# Patient Record
Sex: FEMALE | Race: WHITE | NOT HISPANIC OR LATINO
[De-identification: names, ages, dates, MRNs, and addresses within clinical notes are randomized per-mention and may not be internally consistent; named-entity substitution may affect disease eponyms.]

---

## 2018-06-18 ENCOUNTER — APPOINTMENT (OUTPATIENT)
Dept: OBGYN | Facility: CLINIC | Age: 73
End: 2018-06-18
Payer: MEDICARE

## 2018-06-18 VITALS
WEIGHT: 121 LBS | HEIGHT: 62 IN | HEART RATE: 64 BPM | DIASTOLIC BLOOD PRESSURE: 70 MMHG | BODY MASS INDEX: 22.26 KG/M2 | SYSTOLIC BLOOD PRESSURE: 106 MMHG

## 2018-06-18 DIAGNOSIS — N95.2 POSTMENOPAUSAL ATROPHIC VAGINITIS: ICD-10-CM

## 2018-06-18 DIAGNOSIS — R92.2 INCONCLUSIVE MAMMOGRAM: ICD-10-CM

## 2018-06-18 DIAGNOSIS — Z12.4 ENCOUNTER FOR SCREENING FOR MALIGNANT NEOPLASM OF CERVIX: ICD-10-CM

## 2018-06-18 PROCEDURE — G0101: CPT

## 2018-07-30 ENCOUNTER — APPOINTMENT (OUTPATIENT)
Dept: SURGICAL ONCOLOGY | Facility: CLINIC | Age: 73
End: 2018-07-30
Payer: MEDICARE

## 2018-07-30 VITALS
WEIGHT: 120 LBS | HEART RATE: 59 BPM | RESPIRATION RATE: 16 BRPM | HEIGHT: 62 IN | SYSTOLIC BLOOD PRESSURE: 135 MMHG | DIASTOLIC BLOOD PRESSURE: 80 MMHG | BODY MASS INDEX: 22.08 KG/M2

## 2018-07-30 PROCEDURE — 99203 OFFICE O/P NEW LOW 30 MIN: CPT

## 2018-08-08 ENCOUNTER — RESULT REVIEW (OUTPATIENT)
Age: 73
End: 2018-08-08

## 2018-08-08 ENCOUNTER — OUTPATIENT (OUTPATIENT)
Dept: OUTPATIENT SERVICES | Facility: HOSPITAL | Age: 73
LOS: 1 days | End: 2018-08-08
Payer: MEDICARE

## 2018-08-08 DIAGNOSIS — C43.9 MALIGNANT MELANOMA OF SKIN, UNSPECIFIED: ICD-10-CM

## 2018-08-08 PROCEDURE — 88321 CONSLTJ&REPRT SLD PREP ELSWR: CPT

## 2018-09-10 ENCOUNTER — FORM ENCOUNTER (OUTPATIENT)
Age: 73
End: 2018-09-10

## 2018-09-11 ENCOUNTER — OUTPATIENT (OUTPATIENT)
Dept: OUTPATIENT SERVICES | Facility: HOSPITAL | Age: 73
LOS: 1 days | End: 2018-09-11
Payer: MEDICARE

## 2018-09-11 VITALS
HEART RATE: 64 BPM | DIASTOLIC BLOOD PRESSURE: 83 MMHG | WEIGHT: 119.93 LBS | RESPIRATION RATE: 18 BRPM | HEIGHT: 62.5 IN | SYSTOLIC BLOOD PRESSURE: 117 MMHG | TEMPERATURE: 98 F | OXYGEN SATURATION: 100 %

## 2018-09-11 DIAGNOSIS — C43.62 MALIGNANT MELANOMA OF LEFT UPPER LIMB, INCLUDING SHOULDER: ICD-10-CM

## 2018-09-11 DIAGNOSIS — Z01.818 ENCOUNTER FOR OTHER PREPROCEDURAL EXAMINATION: ICD-10-CM

## 2018-09-11 PROCEDURE — 83615 LACTATE (LD) (LDH) ENZYME: CPT

## 2018-09-11 PROCEDURE — G0463: CPT

## 2018-09-11 PROCEDURE — 71046 X-RAY EXAM CHEST 2 VIEWS: CPT | Mod: 26

## 2018-09-11 PROCEDURE — 85027 COMPLETE CBC AUTOMATED: CPT

## 2018-09-11 PROCEDURE — 71046 X-RAY EXAM CHEST 2 VIEWS: CPT

## 2018-09-11 RX ORDER — LIDOCAINE HCL 20 MG/ML
0.2 VIAL (ML) INJECTION ONCE
Qty: 0 | Refills: 0 | Status: DISCONTINUED | OUTPATIENT
Start: 2018-09-20 | End: 2018-10-05

## 2018-09-11 RX ORDER — SODIUM CHLORIDE 9 MG/ML
3 INJECTION INTRAMUSCULAR; INTRAVENOUS; SUBCUTANEOUS EVERY 8 HOURS
Qty: 0 | Refills: 0 | Status: DISCONTINUED | OUTPATIENT
Start: 2018-09-20 | End: 2018-10-05

## 2018-09-11 NOTE — H&P PST ADULT - ATTENDING COMMENTS
A 73-year-old lady with a 0.5 mm melanoma of her left shoulder schedule for appropriate excision, and primary closure.    Diagnosis and management were reviewed with her in my office and again on morning of procedure.    All questions answered, consent on chart

## 2018-09-11 NOTE — H&P PST ADULT - HISTORY OF PRESENT ILLNESS
73yr old female with malignant melanoma of shoulder  coming in for wide excision left shoulder melanoma

## 2018-09-19 ENCOUNTER — TRANSCRIPTION ENCOUNTER (OUTPATIENT)
Age: 73
End: 2018-09-19

## 2018-09-19 RX ORDER — ONDANSETRON 8 MG/1
4 TABLET, FILM COATED ORAL ONCE
Qty: 0 | Refills: 0 | Status: DISCONTINUED | OUTPATIENT
Start: 2018-09-20 | End: 2018-10-05

## 2018-09-19 RX ORDER — SODIUM CHLORIDE 9 MG/ML
1000 INJECTION, SOLUTION INTRAVENOUS
Qty: 0 | Refills: 0 | Status: DISCONTINUED | OUTPATIENT
Start: 2018-09-20 | End: 2018-10-05

## 2018-09-19 RX ORDER — CELECOXIB 200 MG/1
200 CAPSULE ORAL ONCE
Qty: 0 | Refills: 0 | Status: DISCONTINUED | OUTPATIENT
Start: 2018-09-20 | End: 2018-10-05

## 2018-09-20 ENCOUNTER — RESULT REVIEW (OUTPATIENT)
Age: 73
End: 2018-09-20

## 2018-09-20 ENCOUNTER — OUTPATIENT (OUTPATIENT)
Dept: OUTPATIENT SERVICES | Facility: HOSPITAL | Age: 73
LOS: 1 days | End: 2018-09-20
Payer: MEDICARE

## 2018-09-20 ENCOUNTER — APPOINTMENT (OUTPATIENT)
Dept: SURGICAL ONCOLOGY | Facility: HOSPITAL | Age: 73
End: 2018-09-20

## 2018-09-20 VITALS
RESPIRATION RATE: 12 BRPM | SYSTOLIC BLOOD PRESSURE: 120 MMHG | OXYGEN SATURATION: 100 % | HEART RATE: 83 BPM | DIASTOLIC BLOOD PRESSURE: 63 MMHG

## 2018-09-20 VITALS
WEIGHT: 119.93 LBS | SYSTOLIC BLOOD PRESSURE: 111 MMHG | HEIGHT: 62.5 IN | OXYGEN SATURATION: 98 % | TEMPERATURE: 97 F | RESPIRATION RATE: 18 BRPM | HEART RATE: 55 BPM | DIASTOLIC BLOOD PRESSURE: 72 MMHG

## 2018-09-20 DIAGNOSIS — C43.62 MALIGNANT MELANOMA OF LEFT UPPER LIMB, INCLUDING SHOULDER: ICD-10-CM

## 2018-09-20 DIAGNOSIS — Z01.818 ENCOUNTER FOR OTHER PREPROCEDURAL EXAMINATION: ICD-10-CM

## 2018-09-20 PROCEDURE — 88305 TISSUE EXAM BY PATHOLOGIST: CPT

## 2018-09-20 PROCEDURE — 14001 TIS TRNFR TRUNK 10.1-30SQCM: CPT

## 2018-09-20 PROCEDURE — 14021 TIS TRNFR S/A/L 10.1-30 SQCM: CPT

## 2018-09-20 PROCEDURE — 88305 TISSUE EXAM BY PATHOLOGIST: CPT | Mod: 26

## 2018-09-20 PROCEDURE — 11606 EXC TR-EXT MAL+MARG >4 CM: CPT | Mod: 59

## 2018-09-20 RX ORDER — ACETAMINOPHEN 500 MG
1000 TABLET ORAL ONCE
Qty: 0 | Refills: 0 | Status: COMPLETED | OUTPATIENT
Start: 2018-09-20 | End: 2018-09-20

## 2018-09-20 RX ORDER — LEVOTHYROXINE SODIUM 125 MCG
1 TABLET ORAL
Qty: 0 | Refills: 0 | COMMUNITY

## 2018-09-20 RX ORDER — CELECOXIB 200 MG/1
200 CAPSULE ORAL ONCE
Qty: 0 | Refills: 0 | Status: COMPLETED | OUTPATIENT
Start: 2018-09-20 | End: 2018-09-20

## 2018-09-20 RX ADMIN — Medication 1000 MILLIGRAM(S): at 09:09

## 2018-09-20 RX ADMIN — CELECOXIB 200 MILLIGRAM(S): 200 CAPSULE ORAL at 09:09

## 2018-09-20 NOTE — ASU DISCHARGE PLAN (ADULT/PEDIATRIC). - ITEMS TO FOLLOWUP WITH YOUR PHYSICIAN'S
Dr. Cobian should call with pathology report before Labor Day, that conversation will determine subsequent follow-up Dr. Cobian should call with pathology report by October 01, that conversation will determine subsequent follow-up

## 2018-09-20 NOTE — ASU PATIENT PROFILE, ADULT - PMH
Fall (on) (from) other stairs and steps, sequela  fracture rt. wrist subdural hematoma 8/2017  no tx  Hypothyroidism

## 2018-09-25 LAB — SURGICAL PATHOLOGY STUDY: SIGNIFICANT CHANGE UP
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
## 2018-10-26 PROBLEM — E03.9 HYPOTHYROIDISM, UNSPECIFIED: Chronic | Status: ACTIVE | Noted: 2018-09-11

## 2018-12-02 PROBLEM — Z86.010 HISTORY OF COLONIC POLYPS: Status: RESOLVED | Noted: 2018-07-30 | Resolved: 2018-12-02

## 2018-12-02 PROBLEM — C44.90 SKIN CANCER: Status: ACTIVE | Noted: 2018-07-30

## 2018-12-03 ENCOUNTER — APPOINTMENT (OUTPATIENT)
Dept: SURGICAL ONCOLOGY | Facility: CLINIC | Age: 73
End: 2018-12-03
Payer: MEDICARE

## 2018-12-03 VITALS
BODY MASS INDEX: 21.71 KG/M2 | SYSTOLIC BLOOD PRESSURE: 117 MMHG | DIASTOLIC BLOOD PRESSURE: 69 MMHG | HEART RATE: 57 BPM | HEIGHT: 62 IN | RESPIRATION RATE: 15 BRPM | WEIGHT: 118 LBS

## 2018-12-03 DIAGNOSIS — Z86.010 PERSONAL HISTORY OF COLONIC POLYPS: ICD-10-CM

## 2018-12-03 DIAGNOSIS — C44.90 UNSPECIFIED MALIGNANT NEOPLASM OF SKIN, UNSPECIFIED: ICD-10-CM

## 2018-12-03 PROCEDURE — 99024 POSTOP FOLLOW-UP VISIT: CPT

## 2019-03-20 ENCOUNTER — RX RENEWAL (OUTPATIENT)
Age: 74
End: 2019-03-20

## 2019-04-12 NOTE — H&P PST ADULT - PMH
12
Fall (on) (from) other stairs and steps, sequela  fracture rt. wrist subdural hematoma 8/2017  no tx  Hypothyroidism

## 2019-05-06 ENCOUNTER — APPOINTMENT (OUTPATIENT)
Dept: INTERNAL MEDICINE | Facility: CLINIC | Age: 74
End: 2019-05-06
Payer: MEDICARE

## 2019-05-06 PROCEDURE — 36415 COLL VENOUS BLD VENIPUNCTURE: CPT

## 2019-05-09 LAB
ALBUMIN SERPL ELPH-MCNC: 4.3 G/DL
ALP BLD-CCNC: 51 U/L
ALT SERPL-CCNC: 14 U/L
ANION GAP SERPL CALC-SCNC: 13 MMOL/L
AST SERPL-CCNC: 17 U/L
BILIRUB SERPL-MCNC: 0.6 MG/DL
BUN SERPL-MCNC: 10 MG/DL
CALCIUM SERPL-MCNC: 9.6 MG/DL
CHLORIDE SERPL-SCNC: 106 MMOL/L
CHOLEST SERPL-MCNC: 191 MG/DL
CHOLEST/HDLC SERPL: 2.6 RATIO
CK SERPL-CCNC: 35 U/L
CO2 SERPL-SCNC: 26 MMOL/L
CREAT SERPL-MCNC: 0.71 MG/DL
GLUCOSE SERPL-MCNC: 97 MG/DL
HDLC SERPL-MCNC: 75 MG/DL
LDLC SERPL CALC-MCNC: 98 MG/DL
POTASSIUM SERPL-SCNC: 5.1 MMOL/L
PROT SERPL-MCNC: 6.5 G/DL
SAVE SPECIMEN: NORMAL
SODIUM SERPL-SCNC: 144 MMOL/L
T4 FREE SERPL-MCNC: 1.5 NG/DL
T4 SERPL-MCNC: 7.1 UG/DL
TRIGL SERPL-MCNC: 91 MG/DL
TSH SERPL-ACNC: 1.72 UIU/ML

## 2019-08-05 ENCOUNTER — APPOINTMENT (OUTPATIENT)
Dept: SURGICAL ONCOLOGY | Facility: CLINIC | Age: 74
End: 2019-08-05
Payer: MEDICARE

## 2019-08-05 VITALS
RESPIRATION RATE: 15 BRPM | OXYGEN SATURATION: 98 % | HEART RATE: 55 BPM | DIASTOLIC BLOOD PRESSURE: 75 MMHG | SYSTOLIC BLOOD PRESSURE: 116 MMHG | BODY MASS INDEX: 22.08 KG/M2 | WEIGHT: 120 LBS | HEIGHT: 62 IN

## 2019-08-05 PROCEDURE — 99214 OFFICE O/P EST MOD 30 MIN: CPT

## 2019-08-05 NOTE — HISTORY OF PRESENT ILLNESS
[de-identified] : 74 year-old lady who 2018 underwent wide excision of an uncomplicated 0.5 mm (T1a) melanoma of the POST. LEFT SHOULDER with negative margins, and primary closure.\par \par \par 2018, referred by her dermatologist Dr. Martin Gomez with an uncomplicated 0.5 mm melanoma of the left posterior shoulder\par \par This was an asymptomatic pigmented lesion noted by her S.O. mid-2018\par \par No previous personal OR family history of cutaneous malignancy.\par \par She's had no other cancers in the past.\par \par A paternal cousin had lung cancer.\par Another paternal cousin  of metastatic carcinoma of unknown primary.\par \par Her internist is Dr. Finesse HANCOCK\par \par She does not have a pacemaker defibrillator.\par She takes no anticoagulants.\par \par Meds/PMH:\par thyroid replacement hormone for an underactive gland.\par Statin agent for hyperlipidemia\par \par Her dermatologist is Dr. Martin DALEY\par \par An eye examination in 2018 was unremarkable.\par \par She had mammography 2019 at [facility] which was normal\par \par Her gynecologist is Dr. Denice MULLINS, seen in  2018.\par \par Colonoscopy 2018 with Dr. Finesse Hancock was unremarkable

## 2019-08-05 NOTE — REASON FOR VISIT
[Follow-Up Visit] : a follow-up visit for [Other: _____] : [unfilled] [FreeTextEntry2] : Left shoulder melanoma

## 2019-08-05 NOTE — PHYSICAL EXAM
[Normal] : supple, no neck mass and thyroid not enlarged [Normal Neck Lymph Nodes] : normal neck lymph nodes  [Normal Supraclavicular Lymph Nodes] : normal supraclavicular lymph nodes [Normal Axillary Lymph Nodes] : normal axillary lymph nodes [Normal] : full range of motion and no deformities appreciated [de-identified] : groins not examined [de-identified] : Below

## 2019-08-05 NOTE — ASSESSMENT
[FreeTextEntry1] : September 2018 chest x-ray was normal.\par Prescription Given for a repeat study September 2019, At [facility]\par \par Doing well.\par \par I have asked to see her in approximately 6 months, sooner if needed\par \par

## 2019-10-24 ENCOUNTER — RX RENEWAL (OUTPATIENT)
Age: 74
End: 2019-10-24

## 2020-03-02 ENCOUNTER — APPOINTMENT (OUTPATIENT)
Dept: SURGICAL ONCOLOGY | Facility: CLINIC | Age: 75
End: 2020-03-02
Payer: MEDICARE

## 2020-03-02 VITALS
SYSTOLIC BLOOD PRESSURE: 112 MMHG | BODY MASS INDEX: 22.26 KG/M2 | RESPIRATION RATE: 14 BRPM | DIASTOLIC BLOOD PRESSURE: 68 MMHG | HEART RATE: 61 BPM | WEIGHT: 121 LBS | HEIGHT: 62 IN

## 2020-03-02 PROCEDURE — 99214 OFFICE O/P EST MOD 30 MIN: CPT

## 2020-08-21 NOTE — PHYSICAL EXAM
[Normal] : supple, no neck mass and thyroid not enlarged [Normal Neck Lymph Nodes] : normal neck lymph nodes  [Normal Axillary Lymph Nodes] : normal axillary lymph nodes [Normal Supraclavicular Lymph Nodes] : normal supraclavicular lymph nodes [Normal] : full range of motion and no deformities appreciated [de-identified] : Below [de-identified] : groins not examined

## 2020-08-21 NOTE — ASSESSMENT
[FreeTextEntry1] : August 2019 chest x-ray was normal.\par Prescription Given for a repeat study August 2020, At [facility]\par \par Doing well.\par \par I have asked to see her in approximately 6 months, sooner if needed\par \par \par \par \par

## 2020-08-21 NOTE — REVIEW OF SYSTEMS
[Negative] : Heme/Lymph [de-identified] : Melanoma [de-identified] : Hypothyroid [FreeTextEntry5] : Hyperlipidemia

## 2020-08-21 NOTE — HISTORY OF PRESENT ILLNESS
[de-identified] : 75 year-old lady who 2018 underwent wide excision of an uncomplicated 0.5 mm (T1a) melanoma of the POST. LEFT SHOULDER with negative margins, and primary closure.\par \par \par 2018, referred by her dermatologist Dr. Martin MACKAY with an uncomplicated 0.5 mm melanoma of the left posterior shoulder\par \par This was an asymptomatic pigmented lesion noted by her S.O. mid-2018\par \par No previous personal OR family history of cutaneous malignancy.\par \par She's had no other cancers in the past.\par \par A paternal cousin had lung cancer.\par Another paternal cousin  of metastatic carcinoma of unknown primary.\par \par \par Her internist is Dr. Finesse HANCOCK\par \par She does not have a pacemaker defibrillator.\par She takes no anticoagulants.\par \par Meds/PMH:\par thyroid replacement hormone for an underactive gland.\par Statin agent for hyperlipidemia\par \par \par Her dermatologist is Dr. Martin DALEY\par 2020 biopsy of the inner left shin was okay\par \par \par An eye examination in 2018 was unremarkable.\par Summer 2019 with Dr. Storm Dutta was okay\par \par \par Her mammography will be 2020 at [facility], Prescription provided\par \par \par Her gynecologist is Dr. Denice MULLINS, seen in  2018.\par \par \par Colonoscopy 2018 with Dr. Finesse Hancock was unremarkable

## 2020-10-29 NOTE — BRIEF OPERATIVE NOTE - ANESTHESIOLOGIST NAME
OFFICE VISIT    Patient: Mikhail Khan Date of Service: 10/29/2020   : 1990 MRN: [id number]       TB Reading     Chief Complaint   Patient presents with   • Office Visit   • PPD Reading        PPD skin test read: 10/29/20 10:42 AM. Results: Negative, 0 mm induration.    Problem List Items Addressed This Visit     None      Visit Diagnoses     Screening for tuberculosis    -  Primary        Instructions provided as documented in the AVS.    The patient indicated understanding of the diagnosis and agreed with the plan of care.    Yelena Cordero, CNP  
[facility]

## 2020-11-09 ENCOUNTER — APPOINTMENT (OUTPATIENT)
Dept: SURGICAL ONCOLOGY | Facility: CLINIC | Age: 75
End: 2020-11-09
Payer: MEDICARE

## 2020-11-09 VITALS
SYSTOLIC BLOOD PRESSURE: 132 MMHG | HEIGHT: 62 IN | DIASTOLIC BLOOD PRESSURE: 81 MMHG | OXYGEN SATURATION: 95 % | HEART RATE: 74 BPM | RESPIRATION RATE: 15 BRPM

## 2020-11-09 PROCEDURE — 99214 OFFICE O/P EST MOD 30 MIN: CPT

## 2020-11-09 NOTE — HISTORY OF PRESENT ILLNESS
[de-identified] : 75 year-old lady who 2018 had wide excision of an uncomplicated 0.5 mm (T1a) melanoma of the POST. LEFT SHOULDER with negative margins, and primary closure.\par \par \par 2018, referred by her dermatologist Dr. Martin MACKAY with an uncomplicated 0.5 mm melanoma of the left posterior shoulder\par \par This was an asymptomatic pigmented lesion noted by her S.O. mid-2018\par \par No previous personal OR family history of cutaneous malignancy.\par \par She's had no other cancers in the past.\par \par A paternal cousin had lung cancer.\par Another paternal cousin  of metastatic carcinoma of unknown primary.\par \par \par Her dermatologist is Dr. Martin DALEY\par 2020 biopsy of the inner left shin was okay.\par 2020 visit: No biopsies\par \par \par Her internist is Dr. Finesse HANCOCK\par \par She does not have a pacemaker defibrillator.\par She takes no anticoagulants.\par \par Meds/PMH:\par thyroid replacement hormone for an underactive gland.\par She does not see an endocrinologist\par \par Simvastatin agent for hyperlipidemia.\par Does not see a cardiologist\par \par \par An eye examination in 2018 was unremarkable.\par Summer 2019 with Dr. Storm Dutta was okay\par \par \par 2020:\par Bilateral mammogram at [facility]: BI-RADS 1.\par She is interested in a bilateral ultrasound, prescription provided.  \par Also I gave her prescription for her annual breast imaging 2021\par \par \par Her gynecologist is Dr. Denice MULLINS, seen in  2018.\par Reminded that she is due for a follow-up visit\par \par \par Colonoscopy 2018 with Dr. Finesse Hancock was unremarkable

## 2020-11-09 NOTE — PHYSICAL EXAM
[Normal] : supple, no neck mass and thyroid not enlarged [Normal Neck Lymph Nodes] : normal neck lymph nodes  [Normal Supraclavicular Lymph Nodes] : normal supraclavicular lymph nodes [Normal Axillary Lymph Nodes] : normal axillary lymph nodes [Normal] : full range of motion and no deformities appreciated [de-identified] : groins not examined [de-identified] : Below

## 2020-11-09 NOTE — REASON FOR VISIT
[Follow-Up Visit] : a follow-up visit for [Other: _____] : [unfilled] [FreeTextEntry2] : Stage I left shoulder melanoma

## 2020-11-09 NOTE — REVIEW OF SYSTEMS
[Negative] : Heme/Lymph [FreeTextEntry5] : Hyperlipidemia [de-identified] : Melanoma [de-identified] : Hypothyroid

## 2020-11-09 NOTE — ASSESSMENT
[FreeTextEntry1] : August 2020 chest x-ray was normal.\par Prescription Given for a repeat study August 2021 @[facility]\par \par Doing well.\par \par I have asked to see her in approximately year, sooner if needed\par \par \par \par \par

## 2021-03-01 ENCOUNTER — RX RENEWAL (OUTPATIENT)
Age: 76
End: 2021-03-01

## 2021-05-03 ENCOUNTER — RESULT CHARGE (OUTPATIENT)
Age: 76
End: 2021-05-03

## 2021-06-03 ENCOUNTER — APPOINTMENT (OUTPATIENT)
Dept: INTERNAL MEDICINE | Facility: CLINIC | Age: 76
End: 2021-06-03
Payer: MEDICARE

## 2021-06-03 PROCEDURE — 93306 TTE W/DOPPLER COMPLETE: CPT

## 2021-07-12 ENCOUNTER — APPOINTMENT (OUTPATIENT)
Dept: INTERNAL MEDICINE | Facility: CLINIC | Age: 76
End: 2021-07-12
Payer: MEDICARE

## 2021-07-12 PROCEDURE — 93880 EXTRACRANIAL BILAT STUDY: CPT

## 2021-07-29 ENCOUNTER — NON-APPOINTMENT (OUTPATIENT)
Age: 76
End: 2021-07-29

## 2021-07-29 ENCOUNTER — APPOINTMENT (OUTPATIENT)
Dept: INTERNAL MEDICINE | Facility: CLINIC | Age: 76
End: 2021-07-29
Payer: MEDICARE

## 2021-07-29 VITALS
SYSTOLIC BLOOD PRESSURE: 130 MMHG | BODY MASS INDEX: 21.16 KG/M2 | WEIGHT: 115 LBS | HEART RATE: 62 BPM | DIASTOLIC BLOOD PRESSURE: 91 MMHG | HEIGHT: 62 IN

## 2021-07-29 VITALS — DIASTOLIC BLOOD PRESSURE: 74 MMHG | SYSTOLIC BLOOD PRESSURE: 124 MMHG

## 2021-07-29 DIAGNOSIS — M85.80 OTHER SPECIFIED DISORDERS OF BONE DENSITY AND STRUCTURE, UNSPECIFIED SITE: ICD-10-CM

## 2021-07-29 DIAGNOSIS — F41.9 ANXIETY DISORDER, UNSPECIFIED: ICD-10-CM

## 2021-07-29 DIAGNOSIS — H61.23 IMPACTED CERUMEN, BILATERAL: ICD-10-CM

## 2021-07-29 PROCEDURE — 93000 ELECTROCARDIOGRAM COMPLETE: CPT | Mod: 59

## 2021-07-29 PROCEDURE — G0442 ANNUAL ALCOHOL SCREEN 15 MIN: CPT | Mod: 59

## 2021-07-29 PROCEDURE — G0439: CPT

## 2021-07-29 PROCEDURE — 81003 URINALYSIS AUTO W/O SCOPE: CPT | Mod: QW

## 2021-07-29 PROCEDURE — 36415 COLL VENOUS BLD VENIPUNCTURE: CPT

## 2021-07-29 NOTE — PHYSICAL EXAM
[No Acute Distress] : no acute distress [Well Nourished] : well nourished [Well Developed] : well developed [Well-Appearing] : well-appearing [Normal Sclera/Conjunctiva] : normal sclera/conjunctiva [PERRL] : pupils equal round and reactive to light [EOMI] : extraocular movements intact [Normal Outer Ear/Nose] : the outer ears and nose were normal in appearance [Normal Oropharynx] : the oropharynx was normal [No JVD] : no jugular venous distention [No Lymphadenopathy] : no lymphadenopathy [Supple] : supple [Thyroid Normal, No Nodules] : the thyroid was normal and there were no nodules present [No Respiratory Distress] : no respiratory distress  [No Accessory Muscle Use] : no accessory muscle use [Clear to Auscultation] : lungs were clear to auscultation bilaterally [Normal Rate] : normal rate  [Regular Rhythm] : with a regular rhythm [Normal S1, S2] : normal S1 and S2 [No Murmur] : no murmur heard [No Carotid Bruits] : no carotid bruits [No Abdominal Bruit] : a ~M bruit was not heard ~T in the abdomen [No Varicosities] : no varicosities [Pedal Pulses Present] : the pedal pulses are present [No Edema] : there was no peripheral edema [No Palpable Aorta] : no palpable aorta [No Extremity Clubbing/Cyanosis] : no extremity clubbing/cyanosis [Soft] : abdomen soft [Non Tender] : non-tender [Non-distended] : non-distended [No Masses] : no abdominal mass palpated [No HSM] : no HSM [Normal Bowel Sounds] : normal bowel sounds [Normal Posterior Cervical Nodes] : no posterior cervical lymphadenopathy [Normal Anterior Cervical Nodes] : no anterior cervical lymphadenopathy [No CVA Tenderness] : no CVA  tenderness [No Spinal Tenderness] : no spinal tenderness [No Joint Swelling] : no joint swelling [Grossly Normal Strength/Tone] : grossly normal strength/tone [No Rash] : no rash [Coordination Grossly Intact] : coordination grossly intact [No Focal Deficits] : no focal deficits [Normal Gait] : normal gait [Deep Tendon Reflexes (DTR)] : deep tendon reflexes were 2+ and symmetric [Normal Affect] : the affect was normal [Normal Insight/Judgement] : insight and judgment were intact [de-identified] : bilateral cerumen impaction

## 2021-07-29 NOTE — HISTORY OF PRESENT ILLNESS
[FreeTextEntry1] : h/o melanoma left shoulder melanoma- seeing Dr. Cobian yearly\par  S/p moderna vaccine\par  uses simvistatin 10mg daily

## 2021-07-29 NOTE — HEALTH RISK ASSESSMENT
[Very Good] : ~his/her~  mood as very good [5-9] : 5-9 [No] : No [No falls in past year] : Patient reported no falls in the past year [Patient reported mammogram was normal] : Patient reported mammogram was normal [Patient reported PAP Smear was normal] : Patient reported PAP Smear was normal [HIV test declined] : HIV test declined [Hepatitis C test declined] : Hepatitis C test declined [] : No [Audit-CScore] : 0 [de-identified] : walk [EyeExamDate] : 2021 [MammogramDate] : 2020 [PapSmearDate] : 2020 [ColonoscopyDate] : 10/2018

## 2021-08-12 LAB
25(OH)D3 SERPL-MCNC: 95.1 NG/ML
ALBUMIN SERPL ELPH-MCNC: 4.5 G/DL
ALP BLD-CCNC: 56 U/L
ALT SERPL-CCNC: 14 U/L
ANION GAP SERPL CALC-SCNC: 9 MMOL/L
AST SERPL-CCNC: 13 U/L
BASOPHILS # BLD AUTO: 0.04 K/UL
BASOPHILS NFR BLD AUTO: 1 %
BILIRUB SERPL-MCNC: 0.7 MG/DL
BUN SERPL-MCNC: 9 MG/DL
CALCIUM SERPL-MCNC: 9.8 MG/DL
CHLORIDE SERPL-SCNC: 106 MMOL/L
CHOLEST SERPL-MCNC: 170 MG/DL
CK SERPL-CCNC: 58 U/L
CO2 SERPL-SCNC: 27 MMOL/L
CREAT SERPL-MCNC: 0.7 MG/DL
EOSINOPHIL # BLD AUTO: 0.16 K/UL
EOSINOPHIL NFR BLD AUTO: 4.1 %
ESTIMATED AVERAGE GLUCOSE: 97 MG/DL
GLUCOSE SERPL-MCNC: 100 MG/DL
HBA1C MFR BLD HPLC: 5 %
HCT VFR BLD CALC: 41.7 %
HDLC SERPL-MCNC: 79 MG/DL
HGB BLD-MCNC: 13.3 G/DL
IMM GRANULOCYTES NFR BLD AUTO: 0.3 %
LDLC SERPL CALC-MCNC: 75 MG/DL
LYMPHOCYTES # BLD AUTO: 1.17 K/UL
LYMPHOCYTES NFR BLD AUTO: 29.9 %
MAN DIFF?: NORMAL
MCHC RBC-ENTMCNC: 31 PG
MCHC RBC-ENTMCNC: 31.9 GM/DL
MCV RBC AUTO: 97.2 FL
MONOCYTES # BLD AUTO: 0.39 K/UL
MONOCYTES NFR BLD AUTO: 10 %
NEUTROPHILS # BLD AUTO: 2.14 K/UL
NEUTROPHILS NFR BLD AUTO: 54.7 %
NONHDLC SERPL-MCNC: 91 MG/DL
PLATELET # BLD AUTO: 184 K/UL
POTASSIUM SERPL-SCNC: 5.3 MMOL/L
PROT SERPL-MCNC: 6.6 G/DL
RBC # BLD: 4.29 M/UL
RBC # FLD: 13.3 %
SODIUM SERPL-SCNC: 143 MMOL/L
T4 FREE SERPL-MCNC: 1.6 NG/DL
T4 SERPL-MCNC: 8.4 UG/DL
TRIGL SERPL-MCNC: 80 MG/DL
TSH SERPL-ACNC: 0.25 UIU/ML
VIT B12 SERPL-MCNC: 1204 PG/ML
WBC # FLD AUTO: 3.91 K/UL

## 2021-09-13 ENCOUNTER — RESULT REVIEW (OUTPATIENT)
Age: 76
End: 2021-09-13

## 2021-11-15 ENCOUNTER — APPOINTMENT (OUTPATIENT)
Dept: SURGICAL ONCOLOGY | Facility: CLINIC | Age: 76
End: 2021-11-15
Payer: MEDICARE

## 2021-11-15 VITALS
DIASTOLIC BLOOD PRESSURE: 79 MMHG | HEIGHT: 62 IN | HEART RATE: 66 BPM | OXYGEN SATURATION: 98 % | RESPIRATION RATE: 16 BRPM | WEIGHT: 112 LBS | TEMPERATURE: 96.5 F | SYSTOLIC BLOOD PRESSURE: 126 MMHG | BODY MASS INDEX: 20.61 KG/M2

## 2021-11-15 PROCEDURE — 99214 OFFICE O/P EST MOD 30 MIN: CPT

## 2021-11-15 NOTE — REVIEW OF SYSTEMS
[Negative] : Heme/Lymph [FreeTextEntry5] : Hypercholesterolemia [de-identified] : Melanoma [de-identified] : Hypothyroid

## 2021-11-15 NOTE — HISTORY OF PRESENT ILLNESS
[de-identified] : 76 year-old lady who 2018 had wide excision of an uncomplicated 0.5 mm (T1a) melanoma of the POST. LEFT SHOULDER with negative margins, and primary closure.\par \par \par 2018, referred by her dermatologist Dr. Martin MACKAY with an uncomplicated 0.5 mm melanoma of the left posterior shoulder\par \par This was an asymptomatic pigmented lesion noted by her S.O. mid-2018\par \par No previous personal OR family history of cutaneous malignancy.\par \par + Prior personal history:\par \par \par She's had no other cancers in the past.\par \par A paternal cousin had lung cancer.\par Another paternal cousin  of metastatic carcinoma of unknown primary.\par \par \par Derm: Dr. Martin DALEY\par 2021 visit, no biopsies\par 2020 visit: No biopsies\par \par \par Her internist is Dr. Finesse HANCOCK\par \par She does not have a pacemaker defibrillator.\par She takes no anticoagulants.\par \par Meds/PMH:\par thyroid replacement hormone for an underactive gland.\par She does not see an endocrinologist\par \par Simvastatin agent for hyperlipidemia.\par Does not see a cardiologist\par \par \par An eye examination in Summer 2019 with Dr. Storm Dutta was okay.\par She saw a different ophthalmologist in 2021, but does not recall their name.\par No abnormalities noted.\par \par \par 2021:\par Bilateral mammogram and sonogram at [facility]: BI-RADS 2.\par Prescription provided for 2022\par \par \par Her gynecologist is Dr. Jere SOLITARIO.\par She used to see Dr. Denice Lind\par 2021 Pap smear was normal\par \par \par Colonoscopy 2018 with Dr. Finesse Hancock was unremarkable

## 2021-11-15 NOTE — PHYSICAL EXAM
[Normal] : supple, no neck mass and thyroid not enlarged [Normal Neck Lymph Nodes] : normal neck lymph nodes  [Normal Supraclavicular Lymph Nodes] : normal supraclavicular lymph nodes [Normal Axillary Lymph Nodes] : normal axillary lymph nodes [Normal] : full range of motion and no deformities appreciated [de-identified] : groins not examined [de-identified] : Below

## 2021-11-15 NOTE — ASSESSMENT
[FreeTextEntry1] : September 2021:\par Chest x-ray at [facility]/ was normal.\par Prescription provided for September 2022.\par She may NOT repeat it because she had a direct charge of $93 from radiology, because it was not completely covered by Medicare.\par \par Doing well.\par \par I have asked to see her in approximately year, sooner if needed\par \par \par \par \par

## 2022-02-28 ENCOUNTER — RX RENEWAL (OUTPATIENT)
Age: 77
End: 2022-02-28

## 2022-08-04 DIAGNOSIS — Z11.59 ENCOUNTER FOR SCREENING FOR OTHER VIRAL DISEASES: ICD-10-CM

## 2022-11-28 ENCOUNTER — APPOINTMENT (OUTPATIENT)
Dept: SURGICAL ONCOLOGY | Facility: CLINIC | Age: 77
End: 2022-11-28

## 2022-11-28 VITALS
OXYGEN SATURATION: 97 % | WEIGHT: 115 LBS | SYSTOLIC BLOOD PRESSURE: 126 MMHG | DIASTOLIC BLOOD PRESSURE: 80 MMHG | HEART RATE: 56 BPM | BODY MASS INDEX: 21.16 KG/M2 | RESPIRATION RATE: 16 BRPM | HEIGHT: 62 IN

## 2022-11-28 PROCEDURE — 99214 OFFICE O/P EST MOD 30 MIN: CPT

## 2022-11-28 NOTE — ASSESSMENT
[FreeTextEntry1] : September 2022:\par Chest x-ray at [facility]/ was normal.\par \par Doing well.\par \par I have asked to see her in approximately year, sooner if needed\par \par \par \par \par

## 2022-11-28 NOTE — REASON FOR VISIT
[Follow-Up Visit] : a follow-up visit for [Other: _____] : [unfilled] [FreeTextEntry2] : Left shoulder stage I melanoma

## 2022-11-28 NOTE — REVIEW OF SYSTEMS
[Negative] : Heme/Lymph [FreeTextEntry5] : Hypercholesterolemia [de-identified] : Melanoma [de-identified] : Hypothyroid

## 2022-11-28 NOTE — PHYSICAL EXAM
[Normal] : supple, no neck mass and thyroid not enlarged [Normal Neck Lymph Nodes] : normal neck lymph nodes  [Normal Supraclavicular Lymph Nodes] : normal supraclavicular lymph nodes [Normal Axillary Lymph Nodes] : normal axillary lymph nodes [Normal] : full range of motion and no deformities appreciated [de-identified] : groins not examined [de-identified] : Below

## 2022-11-28 NOTE — HISTORY OF PRESENT ILLNESS
[de-identified] : 77 year-old lady.\par \par 2018:\par Wide excision of an uncomplicated 0.5 mm (T1a) melanoma of the POST. LEFT SHOULDER with negative margins, and primary closure.\par \par \par 2018, referred by her dermatologist Dr. Martin MACKAY with an uncomplicated 0.5 mm melanoma of the left posterior shoulder\par \par This was an asymptomatic pigmented lesion noted by her S.O. mid-2018.\par \par \par No previous personal OR family history of cutaneous malignancy.\par \par + Prior personal history:\par Nonmelanoma skin cancer\par \par + FH:\par A paternal cousin had lung cancer.\par Another paternal cousin  of metastatic carcinoma of unknown primary.\par \par \par Derm: Dr. Martin DALEY\par 2022 visit, no biopsies\par \par \par Her internist is Dr. Finesse HANCOCK\par \par She does not have a pacemaker defibrillator.\par She takes no anticoagulants.\par \par Meds/PMH:\par thyroid replacement hormone for an underactive gland.\par She does not see an endocrinologist\par \par Simvastatin agent for hyperlipidemia.\par Does not see a cardiologist.\par \par Summer 2022 she was diagnosed with osteoporosis on a bone density requested by her gynecologist.\par She has an appointment with an endocrinologist in 2023 (Dr. Arianne CARLOS).\par I offered to try and make her an earlier appointment through the health system, but she declined\par \par \par An eye examination in Summer 2019 with Dr. Storm Dutta was okay.\par She saw a different ophthalmologist in 2021, but does not recall their name.\par No abnormalities noted.\par \par \par 2022:\par Bilateral mammogram and sonogram at [facility]: BI-RADS 2.\par \par \par Her gynecologist is Dr. Jere SOLITARIO.\par She used to see Dr. Denice Lind\par 2021 Pap smear was normal\par \par \par Colonoscopy 2018 with Dr. Finesse Hancock was unremarkable

## 2023-02-20 ENCOUNTER — RX RENEWAL (OUTPATIENT)
Age: 78
End: 2023-02-20

## 2023-05-20 ENCOUNTER — NON-APPOINTMENT (OUTPATIENT)
Age: 78
End: 2023-05-20

## 2023-06-28 DIAGNOSIS — R01.1 CARDIAC MURMUR, UNSPECIFIED: ICD-10-CM

## 2023-07-25 ENCOUNTER — LABORATORY RESULT (OUTPATIENT)
Age: 78
End: 2023-07-25

## 2023-07-25 ENCOUNTER — APPOINTMENT (OUTPATIENT)
Dept: ENDOCRINOLOGY | Facility: CLINIC | Age: 78
End: 2023-07-25
Payer: MEDICARE

## 2023-07-25 VITALS
BODY MASS INDEX: 19.94 KG/M2 | SYSTOLIC BLOOD PRESSURE: 114 MMHG | WEIGHT: 108.38 LBS | DIASTOLIC BLOOD PRESSURE: 66 MMHG | OXYGEN SATURATION: 96 % | HEIGHT: 62 IN | HEART RATE: 71 BPM

## 2023-07-25 PROCEDURE — 36415 COLL VENOUS BLD VENIPUNCTURE: CPT

## 2023-07-25 PROCEDURE — 99204 OFFICE O/P NEW MOD 45 MIN: CPT | Mod: 25

## 2023-07-25 RX ORDER — RISEDRONATE SODIUM 150 MG/1
150 TABLET, FILM COATED ORAL
Qty: 3 | Refills: 4 | Status: ACTIVE | COMMUNITY
Start: 2023-07-25 | End: 1900-01-01

## 2023-07-25 NOTE — ASSESSMENT
[FreeTextEntry1] : 1. Osteoporosis\par - screen for secondary causes\par I advised to the patient on antiresorptive therapy, and reviewed potential options for osteoporosis treatment, including oral and IV bisphosphonates, Prolia, Evenity and rh-PTH therapy. R+B of each options were discussed in details\par - if no contraindications, can resume calcium 500 mg bid, extra OTC vitamin D3 2,000 IU/day\par - continue weight-bearing exercises; advised avoiding high risk sports\par - dental evaluation advised.\par - at present patient prefers resuming Actonel 150 mg q mo (R+B, proper intake)\par \par 2. Hypothyroidism\par - continue L-thyroxine 75 mcg qd, pending labs\par - Proper intake of levothyroxine is reviewed in details, including on an empty stomach, with water only, at least one hour before taking any medications, vitamins, or supplements and three-four hours before taking iron or calcium.\par \par \par If all stable, can return in 6 months

## 2023-07-25 NOTE — HISTORY OF PRESENT ILLNESS
[FreeTextEntry1] : 78 year female  referred for osteoporosis and hypothyroidism management. \par Ms. WEISS  was diagnosed with  hypothyroidism in her early teens. On L-thyroxine for several years, stopped at the age of 20 and restarted in her 20's. She's been on 75 mcg of a generic L-thyroxine. \par She  denies family history of thyroid cancer or history of radiation exposure to head and neck area in a childhood. Her mother was treated for hypothyroidism as well. \par She  was also diagnosed with  osteoporosis in 2022 (prior to that history of osteopenia for many years). She believes she was taking Actonel for about 3 years, stopped about 10 years ago. She was not on ART since then.\par Cancer history: left shoulder melanoma (s/p surgical excision; no chemo)\par Calcium and vitamin D supplementation: currently not taking calcium supplements but is on vitamin D3 1,000 IU/day\par Fracture history: s/p fall in 2017 with a right wrist fracture in 2017\par Bone disease risk factors: no history of kidney stones, liver disease, kidney disease, anticonvulsant use, glucocorticoid use, autoimmune disorders such as rheumatoid arthritis and SLE, COPD, IBD, known malabsorption disorders, or weight loss. \par Family history is negative for calcium disorders. Brother had nephrolithiasis. \par Currently, no symptoms of polyuria, polydipsia, constipation, abdominal pain, mental confusion, depression, bone pain or fractures. \par She  had occasional hip/ spine aches; denies hot flashes, leg swelling, blood clots, history of radiation therapy.\par Menopausal since 55 yo\par She  lost about 1/2" in height.\par Lab:  none recently\par DXA (9/20/22)- LS (-1.6), LFN (-2.6), right TH (-2.7)\par

## 2023-07-25 NOTE — REASON FOR VISIT
[Consultation] : a consultation visit [Osteoporosis] : osteoporosis [Hypothyroidism] : hypothyroidism

## 2023-07-28 LAB
25(OH)D3 SERPL-MCNC: 41.9 NG/ML
ALBUMIN MFR SERPL ELPH: 64.3 %
ALBUMIN SERPL ELPH-MCNC: 4.8 G/DL
ALBUMIN SERPL-MCNC: 4.7 G/DL
ALBUMIN/GLOB SERPL: 1.8 RATIO
ALBUPE: 16.9 %
ALP BLD-CCNC: 69 U/L
ALPHA1 GLOB MFR SERPL ELPH: 3.9 %
ALPHA1 GLOB SERPL ELPH-MCNC: 0.3 G/DL
ALPHA1UPE: 29.7 %
ALPHA2 GLOB MFR SERPL ELPH: 9.2 %
ALPHA2 GLOB SERPL ELPH-MCNC: 0.7 G/DL
ALPHA2UPE: 22.7 %
ALT SERPL-CCNC: 17 U/L
ANION GAP SERPL CALC-SCNC: 12 MMOL/L
AST SERPL-CCNC: 20 U/L
B-GLOBULIN MFR SERPL ELPH: 10.2 %
B-GLOBULIN SERPL ELPH-MCNC: 0.7 G/DL
BETAUPE: 10.6 %
BILIRUB SERPL-MCNC: 0.4 MG/DL
BUN SERPL-MCNC: 11 MG/DL
CALCIUM SERPL-MCNC: 10 MG/DL
CALCIUM SERPL-MCNC: 10 MG/DL
CHLORIDE SERPL-SCNC: 105 MMOL/L
CHOLEST SERPL-MCNC: 188 MG/DL
CO2 SERPL-SCNC: 25 MMOL/L
CREAT SERPL-MCNC: 0.65 MG/DL
DEPRECATED KAPPA LC FREE/LAMBDA SER: 1.39 RATIO
EGFR: 91 ML/MIN/1.73M2
ESTIMATED AVERAGE GLUCOSE: 103 MG/DL
FOLATE SERPL-MCNC: 6.7 NG/ML
GAMMA GLOB FLD ELPH-MCNC: 0.9 G/DL
GAMMA GLOB MFR SERPL ELPH: 12.4 %
GAMMAUPE: 20.1 %
GLUCOSE SERPL-MCNC: 97 MG/DL
HBA1C MFR BLD HPLC: 5.2 %
HDLC SERPL-MCNC: 83 MG/DL
IGA 24H UR QL IFE: NORMAL
INTERPRETATION SERPL IEP-IMP: NORMAL
KAPPA LC 24H UR QL: NORMAL
KAPPA LC CSF-MCNC: 1.58 MG/DL
KAPPA LC SERPL-MCNC: 2.2 MG/DL
LDLC SERPL CALC-MCNC: 87 MG/DL
M PROTEIN SPEC IFE-MCNC: NORMAL
NONHDLC SERPL-MCNC: 105 MG/DL
PARATHYROID HORMONE INTACT: 60 PG/ML
PHOSPHATE SERPL-MCNC: 3.6 MG/DL
POTASSIUM SERPL-SCNC: 4.6 MMOL/L
PROT PATTERN 24H UR ELPH-IMP: NORMAL
PROT SERPL-MCNC: 7.3 G/DL
PROT UR-MCNC: 4 MG/DL
PROT UR-MCNC: <4 MG/DL
SODIUM SERPL-SCNC: 142 MMOL/L
T4 FREE SERPL-MCNC: 1.2 NG/DL
THYROGLOB AB SERPL-ACNC: <20 IU/ML
THYROPEROXIDASE AB SERPL IA-ACNC: 11.9 IU/ML
TRIGL SERPL-MCNC: 102 MG/DL
TSH SERPL-ACNC: 1.5 UIU/ML
TTG IGA SER IA-ACNC: <1.2 U/ML
TTG IGA SER-ACNC: NEGATIVE
TTG IGG SER IA-ACNC: 2.9 U/ML
TTG IGG SER IA-ACNC: NEGATIVE
VIT B12 SERPL-MCNC: 872 PG/ML

## 2023-07-31 LAB — ALP BONE SERPL-MCNC: 11.3 UG/L

## 2023-09-26 ENCOUNTER — NON-APPOINTMENT (OUTPATIENT)
Age: 78
End: 2023-09-26

## 2023-09-26 ENCOUNTER — APPOINTMENT (OUTPATIENT)
Dept: INTERNAL MEDICINE | Facility: CLINIC | Age: 78
End: 2023-09-26
Payer: MEDICARE

## 2023-09-26 VITALS
BODY MASS INDEX: 19.88 KG/M2 | WEIGHT: 108 LBS | HEART RATE: 65 BPM | DIASTOLIC BLOOD PRESSURE: 69 MMHG | HEIGHT: 62 IN | SYSTOLIC BLOOD PRESSURE: 120 MMHG | OXYGEN SATURATION: 96 %

## 2023-09-26 DIAGNOSIS — R00.2 PALPITATIONS: ICD-10-CM

## 2023-09-26 DIAGNOSIS — Z00.00 ENCOUNTER FOR GENERAL ADULT MEDICAL EXAMINATION W/OUT ABNORMAL FINDINGS: ICD-10-CM

## 2023-09-26 DIAGNOSIS — K58.1 IRRITABLE BOWEL SYNDROME WITH CONSTIPATION: ICD-10-CM

## 2023-09-26 DIAGNOSIS — M81.0 AGE-RELATED OSTEOPOROSIS W/OUT CURRENT PATHOLOGICAL FRACTURE: ICD-10-CM

## 2023-09-26 DIAGNOSIS — Z23 ENCOUNTER FOR IMMUNIZATION: ICD-10-CM

## 2023-09-26 LAB
BILIRUB UR QL STRIP: NORMAL
GLUCOSE UR-MCNC: NORMAL
HCG UR QL: 0.2 EU/DL
HGB UR QL STRIP.AUTO: NORMAL
KETONES UR-MCNC: NORMAL
LEUKOCYTE ESTERASE UR QL STRIP: NORMAL
NITRITE UR QL STRIP: NORMAL
PH UR STRIP: 6
PROT UR STRIP-MCNC: NORMAL
SP GR UR STRIP: 1.01

## 2023-09-26 PROCEDURE — G0008: CPT

## 2023-09-26 PROCEDURE — 81003 URINALYSIS AUTO W/O SCOPE: CPT | Mod: QW

## 2023-09-26 PROCEDURE — 93000 ELECTROCARDIOGRAM COMPLETE: CPT

## 2023-09-26 PROCEDURE — 90662 IIV NO PRSV INCREASED AG IM: CPT

## 2023-09-26 PROCEDURE — G0439: CPT

## 2023-12-03 PROBLEM — C43.62: Status: ACTIVE | Noted: 2018-07-30

## 2023-12-04 ENCOUNTER — APPOINTMENT (OUTPATIENT)
Dept: SURGICAL ONCOLOGY | Facility: CLINIC | Age: 78
End: 2023-12-04
Payer: MEDICARE

## 2023-12-04 VITALS
BODY MASS INDEX: 19.51 KG/M2 | HEIGHT: 61.75 IN | WEIGHT: 106 LBS | HEART RATE: 67 BPM | RESPIRATION RATE: 17 BRPM | SYSTOLIC BLOOD PRESSURE: 128 MMHG | DIASTOLIC BLOOD PRESSURE: 80 MMHG | OXYGEN SATURATION: 97 %

## 2023-12-04 DIAGNOSIS — C43.62 MALIGNANT MELANOMA OF LEFT UPPER LIMB, INCLUDING SHOULDER: ICD-10-CM

## 2023-12-04 PROCEDURE — 99214 OFFICE O/P EST MOD 30 MIN: CPT

## 2024-02-01 ENCOUNTER — APPOINTMENT (OUTPATIENT)
Dept: ENDOCRINOLOGY | Facility: CLINIC | Age: 79
End: 2024-02-01

## 2024-02-14 ENCOUNTER — RX RENEWAL (OUTPATIENT)
Age: 79
End: 2024-02-14

## 2024-02-14 RX ORDER — LEVOTHYROXINE SODIUM 0.07 MG/1
75 TABLET ORAL
Qty: 90 | Refills: 3 | Status: ACTIVE | COMMUNITY
Start: 2019-03-20 | End: 1900-01-01

## 2024-02-19 ENCOUNTER — NON-APPOINTMENT (OUTPATIENT)
Age: 79
End: 2024-02-19

## 2024-02-20 RX ORDER — SIMVASTATIN 10 MG/1
10 TABLET, FILM COATED ORAL
Qty: 90 | Refills: 1 | Status: ACTIVE | COMMUNITY
Start: 2024-02-20 | End: 1900-01-01

## 2024-05-07 ENCOUNTER — RX RENEWAL (OUTPATIENT)
Age: 79
End: 2024-05-07

## 2024-05-07 RX ORDER — CLONAZEPAM 1 MG/1
1 TABLET ORAL 3 TIMES DAILY
Qty: 90 | Refills: 0 | Status: ACTIVE | COMMUNITY
Start: 2021-07-29 | End: 1900-01-01

## 2024-05-08 DIAGNOSIS — R09.89 OTHER SPECIFIED SYMPTOMS AND SIGNS INVOLVING THE CIRCULATORY AND RESPIRATORY SYSTEMS: ICD-10-CM

## 2024-05-10 DIAGNOSIS — E55.9 VITAMIN D DEFICIENCY, UNSPECIFIED: ICD-10-CM

## 2024-05-10 DIAGNOSIS — E53.8 DEFICIENCY OF OTHER SPECIFIED B GROUP VITAMINS: ICD-10-CM

## 2024-05-10 DIAGNOSIS — R73.9 HYPERGLYCEMIA, UNSPECIFIED: ICD-10-CM

## 2024-05-10 DIAGNOSIS — E03.9 HYPOTHYROIDISM, UNSPECIFIED: ICD-10-CM

## 2024-05-10 DIAGNOSIS — R39.9 UNSPECIFIED SYMPTOMS AND SIGNS INVOLVING THE GENITOURINARY SYSTEM: ICD-10-CM

## 2024-05-10 DIAGNOSIS — E78.5 HYPERLIPIDEMIA, UNSPECIFIED: ICD-10-CM

## 2024-07-26 ENCOUNTER — RX RENEWAL (OUTPATIENT)
Age: 79
End: 2024-07-26

## 2024-07-26 ENCOUNTER — NON-APPOINTMENT (OUTPATIENT)
Age: 79
End: 2024-07-26

## 2024-10-30 ENCOUNTER — APPOINTMENT (OUTPATIENT)
Dept: INTERNAL MEDICINE | Facility: CLINIC | Age: 79
End: 2024-10-30
Payer: MEDICARE

## 2024-10-30 ENCOUNTER — NON-APPOINTMENT (OUTPATIENT)
Age: 79
End: 2024-10-30

## 2024-10-30 VITALS
SYSTOLIC BLOOD PRESSURE: 105 MMHG | BODY MASS INDEX: 20.03 KG/M2 | HEART RATE: 68 BPM | DIASTOLIC BLOOD PRESSURE: 68 MMHG | OXYGEN SATURATION: 98 % | WEIGHT: 102 LBS | HEIGHT: 60 IN

## 2024-10-30 DIAGNOSIS — K58.1 IRRITABLE BOWEL SYNDROME WITH CONSTIPATION: ICD-10-CM

## 2024-10-30 DIAGNOSIS — E03.9 HYPOTHYROIDISM, UNSPECIFIED: ICD-10-CM

## 2024-10-30 DIAGNOSIS — Z00.00 ENCOUNTER FOR GENERAL ADULT MEDICAL EXAMINATION W/OUT ABNORMAL FINDINGS: ICD-10-CM

## 2024-10-30 DIAGNOSIS — Z23 ENCOUNTER FOR IMMUNIZATION: ICD-10-CM

## 2024-10-30 DIAGNOSIS — E78.5 HYPERLIPIDEMIA, UNSPECIFIED: ICD-10-CM

## 2024-10-30 DIAGNOSIS — R73.9 HYPERGLYCEMIA, UNSPECIFIED: ICD-10-CM

## 2024-10-30 DIAGNOSIS — R00.2 PALPITATIONS: ICD-10-CM

## 2024-10-30 PROCEDURE — G0439: CPT

## 2024-10-30 PROCEDURE — 93000 ELECTROCARDIOGRAM COMPLETE: CPT

## 2024-10-30 PROCEDURE — G0008: CPT

## 2024-10-30 PROCEDURE — 90656 IIV3 VACC NO PRSV 0.5 ML IM: CPT

## 2024-11-26 NOTE — ASU PATIENT PROFILE, ADULT - TEACHING/LEARNING FACTORS IMPACT ABILITY TO LEARN
per chart, pt lives with a friend in a private house, 4 steps to enter, 1 flight inside, PTA pt was I with amb and ADLs
anxiety

## 2025-02-10 ENCOUNTER — RX RENEWAL (OUTPATIENT)
Age: 80
End: 2025-02-10

## 2025-02-19 ENCOUNTER — RX RENEWAL (OUTPATIENT)
Age: 80
End: 2025-02-19

## 2025-07-14 ENCOUNTER — NON-APPOINTMENT (OUTPATIENT)
Age: 80
End: 2025-07-14

## 2025-08-07 ENCOUNTER — RX RENEWAL (OUTPATIENT)
Age: 80
End: 2025-08-07